# Patient Record
Sex: FEMALE
[De-identification: names, ages, dates, MRNs, and addresses within clinical notes are randomized per-mention and may not be internally consistent; named-entity substitution may affect disease eponyms.]

---

## 2024-09-23 ENCOUNTER — APPOINTMENT (OUTPATIENT)
Dept: NEUROLOGY | Facility: CLINIC | Age: 24
End: 2024-09-23
Payer: COMMERCIAL

## 2024-09-23 DIAGNOSIS — R55 SYNCOPE AND COLLAPSE: ICD-10-CM

## 2024-09-23 DIAGNOSIS — Z86.2 PERSONAL HISTORY OF DISEASES OF THE BLOOD AND BLOOD-FORMING ORGANS AND CERTAIN DISORDERS INVOLVING THE IMMUNE MECHANISM: ICD-10-CM

## 2024-09-23 PROCEDURE — 99213 OFFICE O/P EST LOW 20 MIN: CPT

## 2024-09-23 PROCEDURE — G2211 COMPLEX E/M VISIT ADD ON: CPT | Mod: NC

## 2024-09-23 NOTE — DISCUSSION/SUMMARY
[FreeTextEntry1] : Patient with 2 syncopal events in the last 2 years. Both in a setting of recreational marijuana use. Last episode was in early August. Educated that syncope is likely secondary to marijuana use. She has avoided it since the last event and was strongly advised to continue to refrain from use. Patient counseled.   Follow up as needed.  All questions and concerns were addressed to the best of my ability. Emotional support was rendered.

## 2024-09-23 NOTE — REASON FOR VISIT
[Home] : at home, [unfilled] , at the time of the visit. [Patient] : the patient [Consultation] : a consultation visit

## 2024-09-23 NOTE — HISTORY OF PRESENT ILLNESS
[FreeTextEntry1] : Ms. GUTIERREZ presents today for a consultation for symptoms of fainting.  She is a 24 year old young lady with a PMH of iron deficiency anemia currently taking iron tablets. Reports an episode in early August. She was home and smoked marijuana. Afterwards she got up to make something to eat when she felt lightheaded and fainted, falling to the floor. The episode was witnessed by her cousin and patient was told she woke up immediately upon hitting the floor. She was not confused or disoriented, no post fall LOC, headache or head trauma.   Patient admitted that this happened a few year back, also in a setting of using recreational marijuana. No further episodes reported. She denies any headaches, vision changes, double vision, vertigo, lightheadedness, dizziness, numbness, tingling, muscle or joint aches or changes in balance.

## 2024-09-24 ENCOUNTER — TRANSCRIPTION ENCOUNTER (OUTPATIENT)
Age: 24
End: 2024-09-24